# Patient Record
Sex: FEMALE | URBAN - METROPOLITAN AREA
[De-identification: names, ages, dates, MRNs, and addresses within clinical notes are randomized per-mention and may not be internally consistent; named-entity substitution may affect disease eponyms.]

---

## 2024-02-16 ENCOUNTER — APPOINTMENT (OUTPATIENT)
Dept: URBAN - METROPOLITAN AREA SURGERY 12 | Age: 29
Setting detail: DERMATOLOGY
End: 2024-02-16

## 2024-02-16 DIAGNOSIS — L65.9 NONSCARRING HAIR LOSS, UNSPECIFIED: ICD-10-CM

## 2024-02-16 DIAGNOSIS — L72.8 OTHER FOLLICULAR CYSTS OF THE SKIN AND SUBCUTANEOUS TISSUE: ICD-10-CM

## 2024-02-16 DIAGNOSIS — L81.0 POSTINFLAMMATORY HYPERPIGMENTATION: ICD-10-CM

## 2024-02-16 PROCEDURE — OTHER ADDITIONAL NOTES: OTHER

## 2024-02-16 PROCEDURE — OTHER DEFER: OTHER

## 2024-02-16 PROCEDURE — OTHER COUNSELING: OTHER

## 2024-02-16 PROCEDURE — OTHER MIPS QUALITY: OTHER

## 2024-02-16 PROCEDURE — OTHER ORDER TESTS: OTHER

## 2024-02-16 PROCEDURE — 99204 OFFICE O/P NEW MOD 45 MIN: CPT

## 2024-02-16 PROCEDURE — OTHER VENIPUNCTURE: OTHER

## 2024-02-16 PROCEDURE — 36415 COLL VENOUS BLD VENIPUNCTURE: CPT

## 2024-02-16 ASSESSMENT — LOCATION DETAILED DESCRIPTION DERM
LOCATION DETAILED: RIGHT AXILLARY VAULT
LOCATION DETAILED: RIGHT ANTERIOR LATERAL PROXIMAL UPPER ARM
LOCATION DETAILED: RIGHT ANTERIOR PROXIMAL UPPER ARM
LOCATION DETAILED: MEDIAL FRONTAL SCALP

## 2024-02-16 ASSESSMENT — LOCATION ZONE DERM
LOCATION ZONE: ARM
LOCATION ZONE: AXILLAE
LOCATION ZONE: SCALP
LOCATION ZONE: ARM

## 2024-02-16 ASSESSMENT — LOCATION SIMPLE DESCRIPTION DERM
LOCATION SIMPLE: RIGHT UPPER ARM
LOCATION SIMPLE: RIGHT AXILLARY VAULT
LOCATION SIMPLE: RIGHT UPPER ARM
LOCATION SIMPLE: FRONTAL SCALP

## 2024-02-16 NOTE — HPI: HAIR LOSS
Previous Labs: No
How Did The Hair Loss Occur?: gradual in onset
How Severe Is Your Hair Loss?: moderate
What Hair Products Do You Use?: DHT shampoo, castor oil, rosemary oil
Additional History: Pt reports trying minoxidil and no benefit.
When Were The Labs Drawn? (Drawn...): 2 years ago

## 2024-02-16 NOTE — PROCEDURE: ADDITIONAL NOTES
Detail Level: Simple
Render Risk Assessment In Note?: no
Additional Notes: There are a few very faint macular areas of hypopigmentation inferior to area of concern…discussed possible hx of tinea versicolor that is improving and pt may continue ketoconazole cr thinly bid for a few weeks. If spreading or worsening , rtc prn. Discussed does not appear as vitiligo.
Additional Notes: Recommend nutrafol. Discussed other options such as topical or oral minoxidil and spironolactone (pt not interested in this). Wear hair as natural as possible.\\nWill assess labs for any underlying contributing factors . Discussed could be multi factorial alopecia.

## 2024-02-16 NOTE — PROCEDURE: MIPS QUALITY
Quality 431: Preventive Care And Screening: Unhealthy Alcohol Use - Screening: Patient not identified as an unhealthy alcohol user when screened for unhealthy alcohol use using a systematic screening method
Detail Level: Detailed
Quality 226: Preventive Care And Screening: Tobacco Use: Screening And Cessation Intervention: Patient screened for tobacco use, is a smoker AND did not receive Cessation Counseling during measurement period or in the six months prior to the measurement period
Quality 110: Preventive Care And Screening: Influenza Immunization: Influenza immunization was not ordered or administered, reason not given

## 2024-02-16 NOTE — PROCEDURE: ORDER TESTS
Performing Laboratory: [id number]
Expected Date Of Service: 02/16/2024
Billing Type: Third-Party Bill
Bill For Surgical Tray: no

## 2024-02-16 NOTE — PROCEDURE: DEFER
Detail Level: Detailed
X Size Of Lesion In Cm (Optional): 0
Instructions (Optional): Pt not interested in removal at this time. No sx consistent with hs. Discussed possible punch excision in future if desired by pt.
Introduction Text (Please End With A Colon): The following procedure was deferred: excision